# Patient Record
Sex: MALE | Race: BLACK OR AFRICAN AMERICAN
[De-identification: names, ages, dates, MRNs, and addresses within clinical notes are randomized per-mention and may not be internally consistent; named-entity substitution may affect disease eponyms.]

---

## 2019-12-11 ENCOUNTER — HOSPITAL ENCOUNTER (EMERGENCY)
Dept: HOSPITAL 75 - ER | Age: 64
LOS: 1 days | Discharge: ANOTHER HOSPITAL | End: 2019-12-12
Payer: OTHER GOVERNMENT

## 2019-12-11 VITALS — BODY MASS INDEX: 28.03 KG/M2 | HEIGHT: 67.99 IN | WEIGHT: 184.97 LBS

## 2019-12-11 DIAGNOSIS — R68.0: ICD-10-CM

## 2019-12-11 DIAGNOSIS — R91.8: ICD-10-CM

## 2019-12-11 DIAGNOSIS — R00.1: ICD-10-CM

## 2019-12-11 DIAGNOSIS — F31.9: ICD-10-CM

## 2019-12-11 DIAGNOSIS — N17.9: Primary | ICD-10-CM

## 2019-12-11 DIAGNOSIS — E72.20: ICD-10-CM

## 2019-12-11 DIAGNOSIS — Z87.19: ICD-10-CM

## 2019-12-11 DIAGNOSIS — E80.7: ICD-10-CM

## 2019-12-11 DIAGNOSIS — E87.2: ICD-10-CM

## 2019-12-11 DIAGNOSIS — K72.90: ICD-10-CM

## 2019-12-11 DIAGNOSIS — R41.82: ICD-10-CM

## 2019-12-11 LAB
ALBUMIN SERPL-MCNC: 2.1 GM/DL (ref 3.2–4.5)
ALP SERPL-CCNC: 177 U/L (ref 40–136)
ALT SERPL-CCNC: 39 U/L (ref 0–55)
AMMONIA PLAS-SCNC: 313 UMOL/L (ref 11–32)
ANISOCYTOSIS BLD QL SMEAR: SLIGHT
APAP SERPL-MCNC: < 10 UG/ML (ref 10–30)
APTT BLD: 74 SEC (ref 24–35)
APTT PPP: (no result) S
BACTERIA #/AREA URNS HPF: (no result) /HPF
BARBITURATES UR QL: NEGATIVE
BASOPHILS # BLD AUTO: 0 10^3/UL (ref 0–0.1)
BASOPHILS NFR BLD AUTO: 0 % (ref 0–10)
BENZODIAZ UR QL SCN: NEGATIVE
BILIRUB SERPL-MCNC: 33 MG/DL (ref 0.1–1)
BUN/CREAT SERPL: 11
CALCIUM SERPL-MCNC: 7.1 MG/DL (ref 8.5–10.1)
CHLORIDE SERPL-SCNC: 96 MMOL/L (ref 98–107)
CO2 SERPL-SCNC: 6 MMOL/L (ref 21–32)
COCAINE UR QL: NEGATIVE
CREAT SERPL-MCNC: 12.99 MG/DL (ref 0.6–1.3)
EOSINOPHIL # BLD AUTO: 0 10^3/UL (ref 0–0.3)
EOSINOPHIL NFR BLD AUTO: 0 % (ref 0–10)
ERYTHROCYTE [DISTWIDTH] IN BLOOD BY AUTOMATED COUNT: 22.1 % (ref 10–14.5)
FIBRINOGEN PPP-MCNC: (no result) MG/DL
GFR SERPLBLD BASED ON 1.73 SQ M-ARVRAT: 5 ML/MIN
GLUCOSE SERPL-MCNC: 233 MG/DL (ref 70–105)
GLUCOSE UR STRIP-MCNC: NEGATIVE MG/DL
GRAN CASTS #/AREA URNS LPF: (no result) /LPF
HCT VFR BLD CALC: 28 % (ref 40–54)
HGB BLD-MCNC: 10.7 G/DL (ref 13.3–17.7)
INR PPP: 5.1 (ref 0.8–1.4)
KETONES UR QL STRIP: (no result)
LEUKOCYTE ESTERASE UR QL STRIP: (no result)
LYMPHOCYTES # BLD AUTO: 0.9 X 10^3 (ref 1–4)
LYMPHOCYTES NFR BLD AUTO: 5 % (ref 12–44)
MANUAL DIFFERENTIAL PERFORMED BLD QL: YES
MCH RBC QN AUTO: 33 PG (ref 25–34)
MCHC RBC AUTO-ENTMCNC: 39 G/DL (ref 32–36)
MCV RBC AUTO: 87 FL (ref 80–99)
METHADONE UR QL SCN: NEGATIVE
METHAMPHETAMINE SCREEN URINE S: NEGATIVE
MONOCYTES # BLD AUTO: 0.6 X 10^3 (ref 0–1)
MONOCYTES NFR BLD AUTO: 3 % (ref 0–12)
MONOCYTES NFR BLD: 1 %
NEUTROPHILS # BLD AUTO: 16.6 X 10^3 (ref 1.8–7.8)
NEUTROPHILS NFR BLD AUTO: 92 % (ref 42–75)
NEUTS BAND NFR BLD MANUAL: 92 %
NITRITE UR QL STRIP: NEGATIVE
NRBC BLD MANUAL-RTO: 1 %
OPIATES UR QL SCN: NEGATIVE
OXYCODONE UR QL: NEGATIVE
PH UR STRIP: 6.5 [PH] (ref 5–9)
PLATELET # BLD: 197 10^3/UL (ref 130–400)
PMV BLD AUTO: 11 FL (ref 7.4–10.4)
POTASSIUM SERPL-SCNC: 4.6 MMOL/L (ref 3.6–5)
PROPOXYPH UR QL: NEGATIVE
PROT SERPL-MCNC: 5.9 GM/DL (ref 6.4–8.2)
PROT UR QL STRIP: (no result)
PROTHROMBIN TIME: 49.5 SEC (ref 12.2–14.7)
RBC #/AREA URNS HPF: >100 /HPF
SALICYLATES SERPL-MCNC: < 5 MG/DL (ref 5–20)
SODIUM SERPL-SCNC: 131 MMOL/L (ref 135–145)
SP GR UR STRIP: 1.02 (ref 1.02–1.02)
TARGETS BLD QL SMEAR: SLIGHT
TRICYCLICS UR QL SCN: NEGATIVE
VARIANT LYMPHS NFR BLD MANUAL: 7 %
WBC # BLD AUTO: 18.1 10^3/UL (ref 4.3–11)
WBC #/AREA URNS HPF: (no result) /HPF

## 2019-12-11 PROCEDURE — 85027 COMPLETE CBC AUTOMATED: CPT

## 2019-12-11 PROCEDURE — 83605 ASSAY OF LACTIC ACID: CPT

## 2019-12-11 PROCEDURE — 82962 GLUCOSE BLOOD TEST: CPT

## 2019-12-11 PROCEDURE — 70450 CT HEAD/BRAIN W/O DYE: CPT

## 2019-12-11 PROCEDURE — 85610 PROTHROMBIN TIME: CPT

## 2019-12-11 PROCEDURE — 80053 COMPREHEN METABOLIC PANEL: CPT

## 2019-12-11 PROCEDURE — 87088 URINE BACTERIA CULTURE: CPT

## 2019-12-11 PROCEDURE — 71045 X-RAY EXAM CHEST 1 VIEW: CPT

## 2019-12-11 PROCEDURE — 96374 THER/PROPH/DIAG INJ IV PUSH: CPT

## 2019-12-11 PROCEDURE — 85007 BL SMEAR W/DIFF WBC COUNT: CPT

## 2019-12-11 PROCEDURE — 87040 BLOOD CULTURE FOR BACTERIA: CPT

## 2019-12-11 PROCEDURE — 80306 DRUG TEST PRSMV INSTRMNT: CPT

## 2019-12-11 PROCEDURE — 80320 DRUG SCREEN QUANTALCOHOLS: CPT

## 2019-12-11 PROCEDURE — 85730 THROMBOPLASTIN TIME PARTIAL: CPT

## 2019-12-11 PROCEDURE — 51702 INSERT TEMP BLADDER CATH: CPT

## 2019-12-11 PROCEDURE — 96361 HYDRATE IV INFUSION ADD-ON: CPT

## 2019-12-11 PROCEDURE — 81000 URINALYSIS NONAUTO W/SCOPE: CPT

## 2019-12-11 PROCEDURE — 36415 COLL VENOUS BLD VENIPUNCTURE: CPT

## 2019-12-11 PROCEDURE — 87077 CULTURE AEROBIC IDENTIFY: CPT

## 2019-12-11 PROCEDURE — 96375 TX/PRO/DX INJ NEW DRUG ADDON: CPT

## 2019-12-11 PROCEDURE — 80329 ANALGESICS NON-OPIOID 1 OR 2: CPT

## 2019-12-11 PROCEDURE — 82140 ASSAY OF AMMONIA: CPT

## 2019-12-11 NOTE — NUR
2312-iSLUIS MIGUELT PERFORMED AT THIS TIME, BUT iSTAT MACHINE TIME OFF BY ONE HOUR AND 
READS 0012 WHICH IS NOT CORRECT.

## 2019-12-11 NOTE — NUR
PATIENT ARRIVES VIA EMS REPORT STATES PATIENT WAS FOUND IN BACK ROOM OF A 
MOBILE HOME LAYING ON A BED WITHOUT BLANKETS AND ROOM WAS FOUND TO BE COLD. 
REPORTING PARTY STATES PATIENT WAS HOMELESS AND THEY GAVE HIM A PLACE TO STAY 
AND HE WOULD NOT WAKE OR RESPOND WHEN THEY TRIED TO WAKE HIM SO EMS WAS CALLED. 
AT THIS TIME PATIENT'S EYES ARE OPEN HOWEVER DOES NOT ENGAGE WITH STAFF WITH 
VERBAL STIMULATION. PATIENT IS RESPONSIVE TO PAIN AND MOVING HEAD FROM SIDE TO 
SIDE. 

WARM BLANKETS APPLIED AT 2305

BEAR HUGGER APPLIED AT 2310

## 2019-12-12 VITALS — DIASTOLIC BLOOD PRESSURE: 104 MMHG | SYSTOLIC BLOOD PRESSURE: 124 MMHG

## 2019-12-12 LAB — BILIRUB UR QL STRIP: (no result)

## 2019-12-12 NOTE — DIAGNOSTIC IMAGING REPORT
PROCEDURE: CT head without contrast.



TECHNIQUE: Multiple contiguous axial images were obtained through

the brain without the use of intravenous contrast. Auto Exposure

Controls were utilized during the CT exam to meet ALARA standards

for radiation dose reduction. 



INDICATION: Altered mental status and confusion.



Comparison made with prior examination from 04/29/2018.



FINDINGS: Examination is compromised due to motion. There is

prominence of the ventricles and sulci. There is no

hydrocephalus. There is no midline shift. There is no mass,

hemorrhage or extra-axial fluid collection. The calvarium is

intact. The sinuses and mastoid air cells are clear.



IMPRESSION: Examination is technically limited due to motion.

There is some atrophy and mild chronic microvascular ischemic

disease.



No other acute intracranial abnormality



Dictated by: 



  Dictated on workstation # [id number]

## 2019-12-12 NOTE — DIAGNOSTIC IMAGING REPORT
INDICATION: Shortness of breath.



No prior examinations are available for comparison



FINDINGS: The heart size is normal. Mediastinum is unremarkable.

There is a right basilar pneumonia. No pleural effusion or

pneumothorax. Mediastinum is unremarkable.



IMPRESSION: Right basilar pneumonia.



Dictated by: 



  Dictated on workstation # [id number]

## 2019-12-12 NOTE — ED GENERAL
General


Chief Complaint:  Altered Mental Status


Stated Complaint:  AMS


Nursing Sepsis Screen:  No Definite Risk


Source of Information:  EMS, Family, Old Records


Exam Limitations:  No Limitations





History of Present Illness


Date Seen by Provider:  Dec 11, 2019


Time Seen by Provider:  23:02


Initial Comments


This 63-year-old man is brought to the emergency room via EMS after being found 

unresponsive in the bedroom lying on a bed.  EMS reports that he is essentially 

homeless and was staying in the home of some acquaintances.  He has a long-term 

history of alcohol use and hepatitis.  There may also be other substance abuse 

according to his family who presented a short time later.  Patient was minimally

responsive for EMS.  He is hypothermic on arrival with a rectal temperature of 

approximately 85F.  No trauma was suspected.  Patient had bradycardia in the 

30s for EMS and atropine was administered with good response.  Patient's sister 

notes that she had a conversation with him by phone yesterday and he seemed at 

baseline.  The acquaintances with him he was staying stated he had not been 

acting right since yesterday.  Family also reports he has not been feeling well 

recently and had a recent visit at her doctor's office where antibiotics were 

prescribed.





Allergies and Home Medications


Allergies


Coded Allergies:  


     No Known Drug Allergies (Unverified , 5/13/11)





Home Medications


Gabapentin 300 Mg Capsule, 600 MG PO TID, (Reported)


   LAST FILLED #540 11-3-17 


Oxycodone HCl/Acetaminophen 1 Each Tablet, 1-2 TAB PO Q4H PRN for PAIN-MILD TO 

MODERATE


   Prescribed by: ANTOINE NAJERA on 5/1/18 0934


Quetiapine Fumarate 50 Mg Tablet, 50 MG PO HS, (Reported)


   LAST FILLED #30 2-18-18 


Ranitidine HCl 150 Mg Tablet, 150 MG PO DAILY PRN for HEARTBURN, (Reported)


Sertraline HCl 50 Mg Tablet, 50 MG PO DAILY, (Reported)


   LAST FILLED #30 2-18-18 





Patient Home Medication List


Home Medication List Reviewed:  Yes





Review of Systems


Review of Systems


Constitutional:  see HPI


EENTM:  other (scleral icterus)


Respiratory:  no symptoms reported


Cardiovascular:  no symptoms reported


Gastrointestinal:  no symptoms reported


Genitourinary:  see HPI


Musculoskeletal:  no symptoms reported


Skin:  see HPI


Psychiatric/Neurological:  See HPI


Hematologic/Lymphatic:  No Symptoms Reported


Immunological/Allergic:  no symptoms reported





Past Medical-Social-Family Hx


Past Med/Social Hx:  Reviewed Nursing Past Med/Soc Hx


Patient Social History


Alcohol Beverage of Choice:  Beer


Drug of Choice:  POT


Type Used:  Cigars


Recent Foreign Travel:  No


Contact w/Someone Who Travel:  No


Recent Hopitalizations:  No





Immunizations Up To Date


Tetanus Booster (TDap):  Unknown





Seasonal Allergies


Seasonal Allergies:  No





Past Medical History


Surgeries:  Yes (HEAD)


Respiratory:  No


Cardiac:  No


Neurological:  No


Genitourinary:  No


Gastrointestinal:  Yes


Hepatitis


Musculoskeletal:  Yes


Arthritis


Endocrine:  No


HEENT:  No


Cancer:  No


Psychosocial:  Yes


Bipolar


Integumentary:  No


Blood Disorders:  No





Family Medical History





Patient reports no known family medical history.





Physical Exam


Vital Signs





Vital Signs - First Documented








 12/11/19





 23:38


 


Temp 30.0


 


Pulse 51


 


Resp 17


 


B/P (MAP) 102/63


 


O2 Delivery Room Air





Capillary Refill : Less Than 3 Seconds


Height, Weight, BMI


Height: 5'9.00"


Weight: 170lbs. 0.0oz. 77.742376mw; 25.1 BMI


Method:Stated


General Appearance:  No Apparent Distress, Other (Minimally responsive)


HEENT:  PERRL/EOMI, Other (mouth clenched shut.  Pupils equally round and 

reactive.  Scleral icterus noted)


Neck:  Normal Inspection


Respiratory:  Lungs Clear, Normal Breath Sounds, No Accessory Muscle Use, No 

Respiratory Distress


Cardiovascular:  No Edema, No Murmur, Bradycardia


Gastrointestinal:  Normal Bowel Sounds, Non Tender, Soft


Extremity:  Normal Inspection, No Pedal Edema


Neurologic/Psychiatric:  Other (minimally responsive)


Skin:  Warm/Dry, Jaundice





Focused Exam


Lactate Level


12/11/19 23:05: Lactic Acid Level 3.37*H





Lactic Acid Level





Laboratory Tests








Test


 12/11/19


23:05


 


Lactic Acid Level


 3.37 MMOL/L


(0.50-2.00)  *H











Progress/Results/Core Measures


Suspected Sepsis


Recent Fever Within 48 Hours:  No


Infection Criteria Present:  Suspected New Infection


New/Unexplained  Altered Menta:  Yes


Sepsis Screen:  No Definite Risk


SIRS


Temperature: 


Pulse: 51 


Respiratory Rate: 17


 


Laboratory Tests


12/11/19 23:05: White Blood Count 18.1H


Blood Pressure 102 /63 


Mean: 76


 


12/11/19 23:05: Lactic Acid Level 3.37*H


Laboratory Tests


12/11/19 23:05: 


Creatinine 12.99H, INR Comment 5.1*H, Platelet Count 197, Total Bilirubin 33.0*H








Results/Orders


Lab Results





Laboratory Tests








Test


 12/11/19


23:05 12/11/19


23:11 12/11/19


23:30 Range/Units


 


 


White Blood Count


 18.1 H


 


 


 4.3-11.0


10^3/uL


 


Red Blood Count


 3.21 L


 


 


 4.35-5.85


10^6/uL


 


Hemoglobin 10.7 L   13.3-17.7  G/DL


 


Hematocrit 28 L   40-54  %


 


Mean Corpuscular Volume 87    80-99  FL


 


Mean Corpuscular Hemoglobin 33    25-34  PG


 


Mean Corpuscular Hemoglobin


Concent 39 H


 


 


 32-36  G/DL





 


Red Cell Distribution Width 22.1 H   10.0-14.5  %


 


Platelet Count


 197 


 


 


 130-400


10^3/uL


 


Mean Platelet Volume 11.0 H   7.4-10.4  FL


 


Neutrophils (%) (Auto) 92 H   42-75  %


 


Lymphocytes (%) (Auto) 5 L   12-44  %


 


Monocytes (%) (Auto) 3    0-12  %


 


Eosinophils (%) (Auto) 0    0-10  %


 


Basophils (%) (Auto) 0    0-10  %


 


Neutrophils # (Auto) 16.6 H   1.8-7.8  X 10^3


 


Lymphocytes # (Auto) 0.9 L   1.0-4.0  X 10^3


 


Monocytes # (Auto) 0.6    0.0-1.0  X 10^3


 


Eosinophils # (Auto)


 0.0 


 


 


 0.0-0.3


10^3/uL


 


Basophils # (Auto)


 0.0 


 


 


 0.0-0.1


10^3/uL


 


Neutrophils % (Manual) 92     %


 


Lymphocytes % (Manual) 7     %


 


Monocytes % (Manual) 1     %


 


Nucleated Red Blood Cells 1     


 


Anisocytosis SLIGHT     


 


Target Cells SLIGHT     


 


Prothrombin Time 49.5 *H   12.2-14.7  SEC


 


INR Comment 5.1 *H   0.8-1.4  


 


Activated Partial


Thromboplast Time 74 H


 


 


 24-35  SEC





 


Sodium Level 131 L   135-145  MMOL/L


 


Potassium Level 4.6    3.6-5.0  MMOL/L


 


Chloride Level 96 L     MMOL/L


 


Carbon Dioxide Level 6 *L   21-32  MMOL/L


 


Anion Gap 29 H   5-14  MMOL/L


 


Blood Urea Nitrogen 138 *H   7-18  MG/DL


 


Creatinine


 12.99 H


 


 


 0.60-1.30


MG/DL


 


Estimat Glomerular Filtration


Rate 5 


 


 


  





 


BUN/Creatinine Ratio 11     


 


Glucose Level 233 H     MG/DL


 


Lactic Acid Level


 3.37 *H


 


 


 0.50-2.00


MMOL/L


 


Calcium Level 7.1 L   8.5-10.1  MG/DL


 


Corrected Calcium 8.6    8.5-10.1  MG/DL


 


Total Bilirubin 33.0 *H   0.1-1.0  MG/DL


 


Aspartate Amino Transf


(AST/SGOT) 113 H


 


 


 5-34  U/L





 


Alanine Aminotransferase


(ALT/SGPT) 39 


 


 


 0-55  U/L





 


Alkaline Phosphatase 177 H     U/L


 


Ammonia 313 H   11-32  UMOL/L


 


Total Protein 5.9 L   6.4-8.2  GM/DL


 


Albumin 2.1 L   3.2-4.5  GM/DL


 


Salicylates Level < 5.0 L   5.0-20.0  MG/DL


 


Acetaminophen Level < 10 L   10-30  UG/ML


 


Serum Alcohol < 10    <10  MG/DL


 


Glucometer  255 H    MG/DL


 


Urine Color   BROWN H  


 


Urine Clarity   CLOUDY   


 


Urine pH   6.5  5-9  


 


Urine Specific Gravity   1.020  1.016-1.022  


 


Urine Protein   2+ H NEGATIVE  


 


Urine Glucose (UA)   NEGATIVE  NEGATIVE  


 


Urine Ketones   TRACE H NEGATIVE  


 


Urine Nitrite   NEGATIVE  NEGATIVE  


 


Urine Bilirubin   3+ H NEGATIVE  


 


Urine Urobilinogen   1.0  < = 1.0  MG/DL


 


Urine Leukocyte Esterase   TRACE  NEGATIVE  


 


Urine RBC (Auto)   3+ H NEGATIVE  


 


Urine RBC   >100 H  /HPF


 


Urine WBC   25-50 H  /HPF


 


Urine Crystals   PRESENT H  /LPF


 


Urine Leucine Crystals   FEW H  /LPF


 


Urine Bacteria   FEW H  /HPF


 


Urine Casts   PRESENT   /LPF


 


Urine Granular Casts   5-10 H  /LPF


 


Urine Mucus   NEGATIVE   /LPF


 


Urine Culture Indicated


 


 


 CULTURE


PENDING  





 


Urine Opiates Screen   NEGATIVE  NEGATIVE  


 


Urine Oxycodone Screen   NEGATIVE  NEGATIVE  


 


Urine Methadone Screen   NEGATIVE  NEGATIVE  


 


Urine Propoxyphene Screen   NEGATIVE  NEGATIVE  


 


Urine Barbiturates Screen   NEGATIVE  NEGATIVE  


 


Ur Tricyclic Antidepressants


Screen 


 


 NEGATIVE 


 NEGATIVE  





 


Urine Phencyclidine Screen   NEGATIVE  NEGATIVE  


 


Urine Amphetamines Screen   NEGATIVE  NEGATIVE  


 


Urine Methamphetamines Screen   NEGATIVE  NEGATIVE  


 


Urine Benzodiazepines Screen   NEGATIVE  NEGATIVE  


 


Urine Cocaine Screen   NEGATIVE  NEGATIVE  


 


Urine Cannabinoids Screen   POSITIVE H NEGATIVE  








My Orders





Orders - ROSA ISELA EARL MD


Cbc With Automated Diff (12/11/19 23:08)


Comprehensive Metabolic Panel (12/11/19 23:08)


Blood Culture (12/11/19 23:08)


Sputum Culture (12/11/19 23:08)


Urinalysis (12/11/19 23:08)


Urine Culture (12/11/19 23:08)


Protime With Inr (12/11/19 23:08)


Partial Thromboplastin Time (12/11/19 23:08)


Ed Iv/Invasive Line Start (12/11/19 23:08)


Ed Iv/Invasive Line Start (12/11/19 23:08)


Vital Signs Adult Sepsis Patie Q15M (12/11/19 23:08)


O2 (12/11/19 23:08)


Remove Rings In Anticipation O (12/11/19 23:08)


Lactic Acid Analyzer (12/11/19 23:08)


Ammonia (12/11/19 23:08)


I-Stat Bedside Testing (12/11/19 23:08)


Acetaminophen (12/11/19 23:10)


Alcohol (12/11/19 23:10)


Drug Screen Stat (Urine) (12/11/19 23:10)


Salicylate (12/11/19 23:10)


Naloxone  Injection (Narcan Injection) (12/11/19 23:15)


Naloxone Injection (Narcan Injection) (12/11/19 23:09)


Naloxone  Injection (Narcan Injection) (12/11/19 23:09)


Manual Differential (12/11/19 23:05)


Naloxone Injection (Narcan Injection) (12/11/19 23:30)


Sodium Bicarbonate 8.4% Vial (Sodium Bic (12/12/19 00:00)


Sodium Bicarbonate 8.4% Vial (Sodium Bic (12/12/19 00:00)


Hwang Cath (12/12/19 00:01)


Cefepime Injection (Maxipime Injection) (12/12/19 00:15)


Ct Head Wo (12/12/19 23:10)


Chest 1 View, Ap/Pa Only (12/12/19 23:08)





Medications Given in ED





Current Medications








 Medications  Dose


 Ordered  Sig/Jerrod


 Route  Start Time


 Stop Time Status Last Admin


Dose Admin


 


 Cefepime HCl 1000


 mg/Sterile Water  10 ml @ 


 200 mls/hr  ONCE  ONCE


 IV  12/12/19 00:15


 12/12/19 00:17 DC 12/12/19 00:31


200 MLS/HR


 


 Sodium Bicarbonate  50 meq  ONCE  ONCE


 IV  12/12/19 00:00


 12/12/19 00:01 DC 12/12/19 00:30


50 MEQ


 


 Sodium Bicarbonate  50 meq  ONCE  ONCE


 IV  12/12/19 00:00


 12/12/19 00:01 DC 12/12/19 00:31


50 MEQ








Vital Signs/I&O











 12/11/19





 23:38


 


Temp 30.0


 


Pulse 51


 


Resp 17


 


B/P (MAP) 102/63


 


O2 Delivery Room Air














 12/12/19





 00:00


 


Intake Total 500 ml


 


Balance 500 ml





Capillary Refill : Less Than 3 Seconds








Blood Pressure Mean:                    76


POS





Progress Note #1:  


   Time:  00:24


Progress Note


Critical interventions were initiated upon patient arrival.  He was found to 

have a core body temperature of approximately 85F.  2 L of warm normal saline 

were initiated in addition to warm blankets and a bear hugger warmer.  Patient 

received 2 doses of Narcan 0.4 mg which did seem to improve his mental status.  

He is now moving spontaneously and moaning and groaning.  He does not have 

meaningful vocalizations and is not following instructions.  Airway is 

protected.  I-STAT was obtained immediately and a creatinine of 14 was noted.  

Labs have since returned confirming severe renal failure and severe hepatic 

failure.  [facility] is on full admission diversion.  I discussed 

options with family and they requested transfer to [place] in [place].  Case was 

discussed with Dr. Ash, intensivist, who graciously accepted the transfer.  

He requested CT of the head and 2 A of sodium bicarbonate be administered before

transfer.  CT of the head was obtained.  Unfortunately, quality was rather poor 

as patient would not hold his head still.  No hemorrhage was identified.  

Patient's systolic blood pressure has remained stable in the 90s with fluid 

resuscitation.  Heart rate remains in the 40s and 50s.  Cefepime 1 g was ordered

for empiric treatment of possible sepsis.  Hwang catheter was placed and yielded

brown urine. [facility] has been launched for transfer.


Progress Note #2:  


   Time:  01:10


Progress Note


Patient is now departing for [place] with [facility].  Final temperature on 

departure was 89F.  He continued to maintain his airway and was swallowing.  

Dr. Ash was updated.





ECG


Initial ECG Impression Date:  Dec 11, 2019


Initial ECG Impression Time:  23:06


Initial ECG Rate:  65


Initial ECG Rhythm:  Normal Sinus


Comment


Sinus rhythm with no ST elevation or depression.  Patient had bradycardia on 

monitor but not on this EKG.





Diagnostic Imaging





   Diagonstic Imaging:  CT


   Plain Films/CT/US/NM/MRI:  head


Comments


CT head was viewed by me.  [employer] report reviewed.  There is significant 

artifact due to motion.  However, no significant intracranial bleed or mass was 

identified.








   Diagonstic Imaging:  Xray


   Plain Films/CT/US/NM/MRI:  chest


Comments


Chest x-ray viewed by me.  Report not yet available.  Right lower lobe 

infiltrate suspicious for pneumonia or aspiration.





Critical Care Note


Critical Care


Start Time:  23:02


Stop Time:  01:10


Total Time (minutes)


128





Departure


Impression





   Primary Impression:  


   Acute renal failure


   Qualified Codes:  N17.9 - Acute kidney failure, unspecified


   Additional Impressions:  


   Hepatic failure


   Qualified Codes:  K72.91 - Hepatic failure, unspecified with coma


   Hyperammonemia


   Hyperbilirubinemia


   Altered mental status


   Qualified Codes:  R41.82 - Altered mental status, unspecified


   Hypothermia


   Qualified Codes:  T68.XXXA - Hypothermia, initial encounter


   Lactic acidosis


   Sinus bradycardia


   Right lower lobe pulmonary infiltrate


Disposition:  02 XFER SHT-TRM HOSP


Condition:  Stable





Transfer


Transfer Reason:  Exceeds level of care


Time Spoke to Accepting Phy:  23:40


Transfer Progress Notes


Transfer accepted by Dr. Ash, intensivist at [facility]


Transfer Time:  01:10


Transfer Facility:  


[facility]


Method of Transfer:  Air





Departure-Patient Inst.


Referrals:  


NO,LOCAL PHYSICIAN (PCP/Family)


Primary Care Physician











ROSA ISELA EARL MD        Dec 12, 2019 00:33


POS